# Patient Record
Sex: FEMALE | Race: WHITE | NOT HISPANIC OR LATINO
[De-identification: names, ages, dates, MRNs, and addresses within clinical notes are randomized per-mention and may not be internally consistent; named-entity substitution may affect disease eponyms.]

---

## 2022-12-06 ENCOUNTER — NON-APPOINTMENT (OUTPATIENT)
Age: 62
End: 2022-12-06

## 2023-01-04 ENCOUNTER — TRANSCRIPTION ENCOUNTER (OUTPATIENT)
Age: 63
End: 2023-01-04

## 2023-01-04 ENCOUNTER — APPOINTMENT (OUTPATIENT)
Dept: ORTHOPEDIC SURGERY | Facility: CLINIC | Age: 63
End: 2023-01-04
Payer: COMMERCIAL

## 2023-01-04 ENCOUNTER — NON-APPOINTMENT (OUTPATIENT)
Age: 63
End: 2023-01-04

## 2023-01-04 PROBLEM — Z00.00 ENCOUNTER FOR PREVENTIVE HEALTH EXAMINATION: Status: ACTIVE | Noted: 2023-01-04

## 2023-01-04 PROCEDURE — 73562 X-RAY EXAM OF KNEE 3: CPT | Mod: 50

## 2023-01-04 PROCEDURE — 99203 OFFICE O/P NEW LOW 30 MIN: CPT

## 2023-01-04 RX ORDER — NABUMETONE 500 MG/1
500 TABLET, FILM COATED ORAL
Qty: 60 | Refills: 1 | Status: ACTIVE | COMMUNITY
Start: 2023-01-04 | End: 1900-01-01

## 2023-01-06 NOTE — PHYSICAL EXAM
[de-identified] : Bilateral knee\par \par Constitutional: \par The patient is healthy-appearing and in no apparent distress. \par \par Gait:\par The patient ambulates with a normal gait and no limp.\par \par Cardiovascular System: \par The capillary refill is less than 2 seconds. \par \par Skin: \par There are no skin abnormalities.\par \par Bilateral Knee:\par  \par Bony Palpation: \par There is tenderness of the medial joint line. \par There is no tenderness of the lateral joint line.\par There is tenderness of the medial femoral chondyle.\par There is tenderness of the lateral femoral chondyle.\par There is no tenderness of the tibial tubercle.\par There is no tenderness of the superior patella.\par There is no tenderness of the inferior patella.\par There is tenderness of the medial patellar facet.\par There is tenderness of the lateral patellar facet.\par \par Soft Tissue Palpation: \par There is no tenderness of the medial retinaculum.\par There is no tenderness of the lateral retinaculum.\par There is no tenderness of the quadriceps tendon.\par There is no tenderness of the patella tendon.\par There is no tenderness of the ITB.\par There is no tenderness of the pes anserine.\par \par Active Range of Motion: \par The range of motion at the knee actively and passively is full. \par \par Special Tests: \par There is a negative Apley.\par There is a negative Steinmanns. \par There is a negative Lachman and Anterior Drawer.\par There is a negative Posterior Drawer.  \par There is no varus or valgus laxity.\par \par Strength: \par There is 5/5 hip flexion and 5/5 knee flexion and extension.  \par \par Psychiatric: \par The patient demonstrates a normal mood and affect and is active and alert\par \par  [de-identified] : Given patient's reported history and physical examination, x-ray evaluation ( as listed below ) was ordered and performed to aid in diagnosis and treatment of the patient.\par X-ray bilateral knee.  There is mild medial lateral compartment and moderate patellofemoral arthritis\par

## 2023-01-06 NOTE — ASSESSMENT
[FreeTextEntry1] : Discussed at length with patient regarding symptoms and diagnosis.  Treatment options discussed and patient's questions answered and patient expresses understanding. \par Patient offered cortisone injection, but declines at this time.  Patient offered physical therapy but declined and elects home exercises/observation only.\par \par Patient to follow-up in office if persistent or recurrent symptoms.  Patient instructed to call if any questions or concerns.\par 
36.85

## 2023-01-06 NOTE — HISTORY OF PRESENT ILLNESS
[de-identified] : Patient presenting with several months with bilateral knee pain.  Increased with pain with stairs.

## 2023-01-13 ENCOUNTER — TRANSCRIPTION ENCOUNTER (OUTPATIENT)
Age: 63
End: 2023-01-13

## 2023-01-30 ENCOUNTER — TRANSCRIPTION ENCOUNTER (OUTPATIENT)
Age: 63
End: 2023-01-30

## 2023-08-02 ENCOUNTER — TRANSCRIPTION ENCOUNTER (OUTPATIENT)
Age: 63
End: 2023-08-02

## 2024-03-04 ENCOUNTER — APPOINTMENT (OUTPATIENT)
Dept: ORTHOPEDIC SURGERY | Facility: CLINIC | Age: 64
End: 2024-03-04
Payer: COMMERCIAL

## 2024-03-04 VITALS — WEIGHT: 285 LBS | HEIGHT: 65 IN | BODY MASS INDEX: 47.48 KG/M2

## 2024-03-04 DIAGNOSIS — M17.0 BILATERAL PRIMARY OSTEOARTHRITIS OF KNEE: ICD-10-CM

## 2024-03-04 PROCEDURE — 20610 DRAIN/INJ JOINT/BURSA W/O US: CPT | Mod: 50

## 2024-03-04 PROCEDURE — 99213 OFFICE O/P EST LOW 20 MIN: CPT | Mod: 25

## 2024-03-05 NOTE — PROCEDURE

## 2024-03-05 NOTE — HISTORY OF PRESENT ILLNESS
[de-identified] : Last Visit: 1/4/2024 - would like to discuss shot injection options and physical therapy. Reason: BILATERAL KNEE PAIN right knee worse then Left Pain:  3/10 discomfort  - both knees  Symptoms: stiffness / Pressure/ lateral instability

## 2024-03-05 NOTE — PHYSICAL EXAM
[de-identified] : Bilateral knee\par  \par  Constitutional: \par  The patient is healthy-appearing and in no apparent distress. \par  \par  Gait:\par  The patient ambulates with a normal gait and no limp.\par  \par  Cardiovascular System: \par  The capillary refill is less than 2 seconds. \par  \par  Skin: \par  There are no skin abnormalities.\par  \par  Bilateral Knee:\par   \par  Bony Palpation: \par  There is tenderness of the medial joint line. \par  There is no tenderness of the lateral joint line.\par  There is tenderness of the medial femoral chondyle.\par  There is tenderness of the lateral femoral chondyle.\par  There is no tenderness of the tibial tubercle.\par  There is no tenderness of the superior patella.\par  There is no tenderness of the inferior patella.\par  There is tenderness of the medial patellar facet.\par  There is tenderness of the lateral patellar facet.\par  \par  Soft Tissue Palpation: \par  There is no tenderness of the medial retinaculum.\par  There is no tenderness of the lateral retinaculum.\par  There is no tenderness of the quadriceps tendon.\par  There is no tenderness of the patella tendon.\par  There is no tenderness of the ITB.\par  There is no tenderness of the pes anserine.\par  \par  Active Range of Motion: \par  The range of motion at the knee actively and passively is full. \par  \par  Special Tests: \par  There is a negative Apley.\par  There is a negative Steinmanns. \par  There is a negative Lachman and Anterior Drawer.\par  There is a negative Posterior Drawer.  \par  There is no varus or valgus laxity.\par  \par  Strength: \par  There is 5/5 hip flexion and 5/5 knee flexion and extension.  \par  \par  Psychiatric: \par  The patient demonstrates a normal mood and affect and is active and alert\par  \par

## 2024-03-05 NOTE — ASSESSMENT
[FreeTextEntry1] : Discussed at length with patient severity of arthritis and treatment options and at this time patient elects cortisone injection of the right knee observation with understanding ultimately she may require total knee replacement for pain relief

## 2024-08-06 ENCOUNTER — TRANSCRIPTION ENCOUNTER (OUTPATIENT)
Age: 64
End: 2024-08-06

## 2024-08-06 ENCOUNTER — NON-APPOINTMENT (OUTPATIENT)
Age: 64
End: 2024-08-06

## 2024-09-04 ENCOUNTER — APPOINTMENT (OUTPATIENT)
Dept: ORTHOPEDIC SURGERY | Facility: CLINIC | Age: 64
End: 2024-09-04
Payer: COMMERCIAL

## 2024-09-04 DIAGNOSIS — M25.879 OTHER SPECIFIED JOINT DISORDERS, UNSPECIFIED ANKLE AND FOOT: ICD-10-CM

## 2024-09-04 DIAGNOSIS — M21.41 FLAT FOOT [PES PLANUS] (ACQUIRED), RIGHT FOOT: ICD-10-CM

## 2024-09-04 DIAGNOSIS — M21.42 FLAT FOOT [PES PLANUS] (ACQUIRED), RIGHT FOOT: ICD-10-CM

## 2024-09-04 PROCEDURE — 99213 OFFICE O/P EST LOW 20 MIN: CPT

## 2024-09-04 PROCEDURE — 73610 X-RAY EXAM OF ANKLE: CPT | Mod: 50

## 2024-09-04 NOTE — PHYSICAL EXAM
[de-identified] : Bilateral ankle  Constitutional:  The patient is healthy-appearing and in no apparent distress.   Gait and Station:  The patient ambulates with a normal gait and no limp.   Cardiovascular System:  The capillary refill is less than 2 seconds.   Skin:  There are no skin abnormalities of ankle.  Ankles and Feet:  Inspection:  There is no erythema. There is no induration. There is no warmth. There is severe pes planus and heel valgus. There is diffuse leg swelling/fullness (chronic)  Bony Palpation:  There is no tenderness of the calcaneal tuberosity. There is no tenderness of the metatarsals. There is no tenderness of the tarsometatarsal joints There is no tenderness of the navicular tuberosity.  There is no tenderness of the dome of talus. There is no tenderness of the head of talus. There is no tenderness of the inferior tibiofibular joint.  Soft Tissue Palpation:  There is no tenderness of the tibialis posterior. There is no tenderness of the tibialis anterior.  There is no tenderness of the plantar fascia. There is no tenderness of the Achilles tendon. There is no tenderness of the extensor hallucis longus. There is no tenderness of the sinus tarsi.  There is no tenderness of the peroneus longus and brevis. There is no tenderness of the deltoid ligament.    There is no tenderness of the anterior talofibular ligament and the calcaneofibular ligament.   Active Range of Motion:  The range of motion at the ankle is full.   Stability:  The anterior drawer is negative.   Strength:  There is 5/5 ankle plantarflexion and dorsiflexion.  Neurological System:  There is normal sensation to light touch at the ankle and foot.   Psychiatric:  The patient demonstrates a normal mood and affect and is active and alert.  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

## 2024-09-04 NOTE — ASSESSMENT
[FreeTextEntry1] : Discussed at length with patient hindfoot and foot malalignment and mid-foot DJD.  Recommend arch supports as well as consideration to compression stockings.

## 2024-09-04 NOTE — HISTORY OF PRESENT ILLNESS
[de-identified] :  New Issue: Bilateral ankle Duration: 6 months Prior studies: x rays ordered  Pain level:  5/10 Symptoms: Stiffness / Dull / Swelling  Physical therapy/Home exercises: none

## 2024-09-04 NOTE — HISTORY OF PRESENT ILLNESS
[de-identified] :  New Issue: Bilateral ankle Duration: 6 months Prior studies: x rays ordered  Pain level:  5/10 Symptoms: Stiffness / Dull / Swelling  Physical therapy/Home exercises: none

## 2024-09-04 NOTE — PHYSICAL EXAM
[de-identified] : Bilateral ankle  Constitutional:  The patient is healthy-appearing and in no apparent distress.   Gait and Station:  The patient ambulates with a normal gait and no limp.   Cardiovascular System:  The capillary refill is less than 2 seconds.   Skin:  There are no skin abnormalities of ankle.  Ankles and Feet:  Inspection:  There is no erythema. There is no induration. There is no warmth. There is severe pes planus and heel valgus. There is diffuse leg swelling/fullness (chronic)  Bony Palpation:  There is no tenderness of the calcaneal tuberosity. There is no tenderness of the metatarsals. There is no tenderness of the tarsometatarsal joints There is no tenderness of the navicular tuberosity.  There is no tenderness of the dome of talus. There is no tenderness of the head of talus. There is no tenderness of the inferior tibiofibular joint.  Soft Tissue Palpation:  There is no tenderness of the tibialis posterior. There is no tenderness of the tibialis anterior.  There is no tenderness of the plantar fascia. There is no tenderness of the Achilles tendon. There is no tenderness of the extensor hallucis longus. There is no tenderness of the sinus tarsi.  There is no tenderness of the peroneus longus and brevis. There is no tenderness of the deltoid ligament.    There is no tenderness of the anterior talofibular ligament and the calcaneofibular ligament.   Active Range of Motion:  The range of motion at the ankle is full.   Stability:  The anterior drawer is negative.   Strength:  There is 5/5 ankle plantarflexion and dorsiflexion.  Neurological System:  There is normal sensation to light touch at the ankle and foot.   Psychiatric:  The patient demonstrates a normal mood and affect and is active and alert.  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